# Patient Record
Sex: FEMALE | HISPANIC OR LATINO | URBAN - METROPOLITAN AREA
[De-identification: names, ages, dates, MRNs, and addresses within clinical notes are randomized per-mention and may not be internally consistent; named-entity substitution may affect disease eponyms.]

---

## 2018-05-03 ENCOUNTER — IMPORTED ENCOUNTER (OUTPATIENT)
Dept: URBAN - METROPOLITAN AREA CLINIC 31 | Facility: CLINIC | Age: 23
End: 2018-05-03

## 2018-05-03 PROCEDURE — 92004 COMPRE OPH EXAM NEW PT 1/>: CPT

## 2018-05-03 PROCEDURE — 92015 DETERMINE REFRACTIVE STATE: CPT

## 2018-05-03 NOTE — PATIENT DISCUSSION
1.  Refractive error - rx new glasses. 2. Return for an appointment in 1 year for comprehensive exam. with Dr. Ami Payton

## 2022-04-01 ASSESSMENT — VISUAL ACUITY
OD_CC: 20/30+1
OS_CC: 20/20
OD_SC: J1+
OS_SC: J1+